# Patient Record
Sex: FEMALE | Race: WHITE
[De-identification: names, ages, dates, MRNs, and addresses within clinical notes are randomized per-mention and may not be internally consistent; named-entity substitution may affect disease eponyms.]

---

## 2018-10-02 ENCOUNTER — HOSPITAL ENCOUNTER (EMERGENCY)
Dept: HOSPITAL 95 - ER | Age: 73
Discharge: HOME | End: 2018-10-02
Payer: MEDICARE

## 2018-10-02 VITALS — WEIGHT: 189.99 LBS | HEIGHT: 65 IN | BODY MASS INDEX: 31.65 KG/M2

## 2018-10-02 DIAGNOSIS — Z85.21: ICD-10-CM

## 2018-10-02 DIAGNOSIS — T18.128A: Primary | ICD-10-CM

## 2018-10-02 DIAGNOSIS — E78.5: ICD-10-CM

## 2018-10-02 DIAGNOSIS — Z79.899: ICD-10-CM

## 2018-10-02 DIAGNOSIS — I10: ICD-10-CM

## 2018-10-02 DIAGNOSIS — Z90.89: ICD-10-CM

## 2019-12-18 NOTE — NUR
4045 CAITLIN EDWARDS AT BEDSIDE GIVING D/C INSTRUCTIONS AFTER 1300 XRAY. IS
TO RETURN TOMORROW MORNING FOR FOLLOW UP CHEST XRAY. DISCHARGE INSTRUCTIONS
RE-ITERATED AND PATIENT VERBALIZED UNDERSTANDING. LEFT CHEST BANDAID IS CDI.
O2 SATS AND VS ARE WNL. HOME WITH FAMILY

## 2019-12-18 NOTE — NUR
ARRIVAL TO STEP, AWAKE, ALERT, NO C/O PAIN OR DISCOMFORT. BANDAID TO LEFT
CHEST CDI. REPORT RECEIVED FROM CAITLIN EDWARDS.O2 SATS AND VS WNL.

## 2020-02-07 NOTE — NUR
LEFT RADIAL TR BAND SITE SOFT NON-TENDER WITH NO HEMATOMA AND NO PULSATILE
BLEEDING. RIGHT GROIN SITE SOFT NON-TENDER WITH NO HEMATOMA AND NO PULSATILE
BLEEDING AND INTACT DRESSING. PT DENIES ANY PAIN; CALL LIGHT IN REACH.

## 2020-02-07 NOTE — NUR
AMBULATED TO THE BATHROOM. TOLERATED WELL. NO BLEEDING AT RIGHT GROIN. LEFT
RADIAL WRIST WITH TR BAND INTACT. DRESSING FOR DISCHARE.

## 2020-02-07 NOTE — NUR
9 CC OF AIR REMOVED OVER 15 MIN FROM NOW DEFLATED LEFT TR BAND; NO HEMATOMA,
NO BLEEDING, SOFT AND NON-TENDER.

## 2020-02-07 NOTE — NUR
TR BAND REMOVED. NO BLEEDING AT SIGHT BUT BRUISED. SITE CLEANED. POLYMEM,
IMMOBILIZER AND SLING APPLIED.IV REMOVED INTACT. 2X2, COBAN AND MANUAL
PRESSURE APPLIED. DISCHARGE INSTRUCTIONS GIVEN WITH VERBAL AND WRITTEN
UNDERSTANDING

## 2020-07-17 NOTE — NUR
DR GALE ROUNDS
DR GALE ROUNDED, UPDATED ON PT STATUS SINCE ARRIVAL TO ICU. PER DR GALE PLAN
TO START PT ON LASIX 40MG IVP Q8H. LEVOPHED GTT TO REMAIN RUNNING AS EXPECTED
FOR BP TO DROP D/T LASIX ADMINISTRATION. PT TO HAVE ECHO DONE TODAY. NO
FURTHER ORDERS AT THIS TIME. WILL CONT TO MONITOR PT.

## 2020-07-17 NOTE — NUR
TRACH SUCTION
PT REQUESTING TO BE SUCTIONED. SUCTIONING TRACH PRODUCED SMALL AMOUNT RED
FROTHY SECRETIONS. PT DENIES ANY FURTHER NEEDS. CALL LIGHT IN REACH. WILL CONT
TO MONITOR PT.

## 2020-07-17 NOTE — NUR
ASSUMED CARE NOTE
RECEIVED REPORT FROM JOSUE WEISS. BEDSIDE ROUNDING DONE. PT RESTING IN BED,
HOB ELEVATED 30 DEGREES. PT EASILY AROUSES TO VERBAL STIMULI, PLEASANT AND
COOPERATIVE WITH CARE. PT WITH RIGHT GROIN CENTRAL LINE WITH LEVOPHED GTT
RUNNING AT 2.5MCG/MIN AND TKO. VSS, SEE FLOWSHEET. TRACH TO VENTILATOR,
SETTINGS: SPONTANEOUS WITH FIO2 50%. PT WITHOUT ANY COMPLAINTS. CALL LIGHT IN
REACH. BEDSIDE TABLE WITH TABLET AND PHONE IN REACH. WILL CONT TO MONITOR PT.

## 2020-07-17 NOTE — NUR
SHIFT SUMMARY
 
PATIENT WAS VERY PLEASANT TO WORK WITH THIS MORNING. SHORTLY AFTER ARRIVING TO
UNIT, BLOOD PRESSURE DROPPED TO 62/46, CONFIRMED WITH MANUAL CUFF. DR. TAYLOR WAS PROMPTLY NOTIFIED, ARRIVED AT BEDSIDE TO PLACE CENTRAL LINE,
LEVOPHED DRIP WAS STARTED. ARITA CATH WAS PLACED AND LASIX DRIP STARTED. PT.
REMAINS IN GREAT SPIRITS. ASSESSMENT IS AS CHARTED. VSS. WILL CONTINUE TO
MONITOR.

## 2020-07-17 NOTE — NUR
ECHO
ECHO TECH AT BEDSIDE TO PERFORM ECHOCARDIOGRAM AT THIS TIME. PT REPOSITIONED
TO LEFT SIDE USING PILLOWS FOR THIS.

## 2020-07-17 NOTE — NUR
DR MELO REQUEST
PT WITH VERY POOR CARDIAC FUNCTION. PER DR MELO, NOC RN TO CALL IF ANY
HEMODYNAMIC OR ELECTRIC INSTABILITY ARE NOTED. INFORMED DR MELO THAT THIS
WOULD BE RELAYED IN REPORT.

## 2020-07-17 NOTE — NUR
DR GALE ROUNDS
PER DR GALE PT TO TRY PO ASA AND STATIN, AWAITING ORDER FOR STATIN. ORDER FOR
CARDIOLOGY CONSULT, REASON NSTEMI. NO FURTHER CHANGES TO PLAN OF CARE AT THIS
TIME. WILL CONT TO MONITOR PT.

## 2020-07-17 NOTE — NUR
DR SNELL ROUNDS
DR SNELL UPDATED ON PT STATUS SINCE ARRIVAL TO ICU. PER DR SNELL D/C LASIX
GTT AND IVP LASIX. POSSIBLE PLAN TO DIURESE PT AFTER LEVOPHED GTT IS OFF. NO
FURTHER CHANGES TO PLAN OF CARE AT THIS TIME. WILL CONT TO MONITOR PT.

## 2020-07-17 NOTE — NUR
CALL FROM DR MELO
PER DR MELO ORDER FOR METOPROLOL TO BE D/C AS PT ON LEVOPHED GTT. ORDER D/C
AS INSTRUCTED.

## 2020-07-17 NOTE — NUR
HEPARIN GTT
PER PHARMACY HEPARIN GTT TO BE STARTED AT 2000 PER PHARMACY DOSING. THIS IS
DUE TO PT HAVING RECEIVED 80MG LOVENOX AS ORDERED THIS MORNING.

## 2020-07-17 NOTE — NUR
ST ELEVATION NOTED
ST ELEVATION NOTED ON MONITOR. DR GALE AWARE. PT WITH CARDIAC HX OF STENT
EARLIER IN YEAR AT WHICH TIME PT WAS NOTED TO HAVE LESION ON CIRCUMFLEX BUT
D/T PT'S CANCER SURGERY REPAIR OF THIS WAS POSTPONED. ORDER FOR STAT EKG. EKG
DONE AND SHOWN TO DR GALE ALONG WITH EKG DONE IN ER YESTERDAY EVENING. ORDERS
RECEIVED FOR ENOXAPARIN 1MG/KG Q12H. THIS ORDER PLACED IN EMR AND EXISTING
ORDER FOR ENOXAPARIN 40MG DAILY D/C. ECHO TAKING PLACE CURRENTLY. NO FURTHER
ORDERS AT THIS TIME. WILL CONT TO MONITOR PT.

## 2020-07-17 NOTE — NUR
DR MELO ROUNDS
DR KAMERON ULLOA, OLU, CHARGE RN, PRESENT. DR MELO UPDATED ON PT STATUS.
PER [facility], ORDERS RECEIVED FOR HEPARIN GTT WITHOUT BOLUS, ASA 81MG PO NOW,
AND PLAVIX 300MG PO NOW. PT ALREADY RECEIVED ASA THIS MORNING, NO ADDITIONAL
ASA TO BE GIVEN. PT TO REMAIN NPO EXCEPT FOR MEDS. NO FURTHER CHANGES TO PLAN
OF CARE AT THIS TIME. OLU PLACED ORDERS AS REQUESTED BY DR MELO.

## 2020-07-17 NOTE — NUR
SHIFT SUMMARY
PT RESTING IN BED, HOB ELEVATED 30 DEGREES. PT EASILY AROUSABLE, ALERT AND
ORIENTED WHEN AWAKE, PLEASANT AND COOPERATIVE WITH CARE. PT CONTINUES TO DENY
ANY PAIN OR DISCOMFORT. LS IMPROVING, REMAIN DIMINISHED IN BASES. PT REMAINS
WITH TRACH TO VENTILATOR ON SPONTANEOUS WITH PEEP 5 AND FIO2 50%. PT CONTINUES
TO DENY ANY SOB. SUCTIONING OCCASIONALLY, PRODUCING SMALL AMOUNTS RED FROTHY
SECRETIONS. BP STABLE ON LEVOPHED, TITRATED TO EFFECT. PT CONTINUES TO DENY
ANY CHEST PAIN/PRESSURE/PALPITATIONS. HR IN 80-90S. DR MELO CONSULTED D/T
ELEVATED TROPONINS AND ABNORMAL EKG, PLAN FOR POSSIBLE INTERVENTION TOMORROW,
PT TO REMAIN NPO. SEE FLOWSHEET FOR VS. ABD NORMAL PER PT, SOFT, NONTENDER. BT
X4, HYPOACTIVE. NO BM. ARITA CATHETER REMAINS PATENT, DRAINING TO GRAVITY.
SKIN OVERALL C/D/I, SCATTERED BRUISING TO BUE. PIV TO KIARRA PULLED D/T AS IT WAS
PAINFUL WITH FLUSH LATER IN SHIFT. CENTRAL LINE TO RIGHT GROIN WNL. NS TKO X2.
PT ADJUSTS POSITION IN BED INDEPENDENTLY, REFUSED REPOSITIONING FROM SIDE TO
SIDE USING PILLOWS, ALTHOUGH AGREED TO HAVING PILLOW UNDER BLE FOR PRESSURE
ALLEVIATION. ECHO DONE. NO ACUTE CHANGES THIS SHIFT.
BED IN LOWEST POSITION, UPPER SIDE RAILS RAISED, CALL LIGHT IN REACH. BEDSIDE
TABLE IN REACH. WILL CONT TO MONITOR PT.

## 2020-07-18 NOTE — NUR
SHIFT SUMMARY
 
PATIENT HAS SLEPT WELL THROUGH NIGHT. NO ACUTE CHANGES TO NOTE OVERNIGHT.
TOLERATING VENTILATOR WELL, LUNG SOUNDS MARGINALLY IMPROVED, HOWEVER WERE NOT
TERRIBLY COARSE AT BEGIN OF SHIFT. AM CURRENTLY
REPLACING ELECTROLYTES, ACCIDENTALLY ORDERED MAGNESIUM AS A DAILY DOSE, WILL
DISCONTINUE FUTURE ORDERS AS PATIENT IS RECEIVING ONE DOSE OF 2 GRAMS AS PER
DR. TAYLOR. NO COMPLAINTS OF CHEST PAIN, DIFFICULTY BREATHING, SHORTNESS OF
BREATH. VSS. ASSESSMENT IS AS CHARTED. WILL CONTINUE TO MONITOR.

## 2020-07-18 NOTE — NUR
UPDATE
PT RESTING IN BED. DR. ALVARADO CAME IN TO SEE PT THIS AM AND WANTS BE TO
BE ON TRACH COLLAR. RT SET PT UP AND PT HAS BEEN TOLERATING WELL. LEVOPHED
WAS TITRATED OFF AT [address] DUMONT WAS IN THIS AM AND SAYS
THE PT WILL GO TO CATH LAB ON MONDAY. THE CREATININE NEEDS TO IMPROVE BEFORE
THEY TAKE HER FOR PROBABLE INTERVENTION. PT IS ON HEPARIN PER PHARMACY. DENIES
CP OR PRESSURE, N/V AND SOB.

## 2020-07-18 NOTE — NUR
SUMMARY
PT DID WELL TODAY. WAS ABLE TO MOVE FROM VENT TO TRACH TO TRACH COLLAR. SHE
HAS TOLERATED TRACH COLLAR WELL. LEVOPHED WAS TITRATED OFF THIS AM. NEGATIVE
ON I&O. UP TO CHAIR WITHOUT ANY DIFFICULTIES. DOES WELL WITH ONE PERSON
ASSIST. DR. ALVARADO CAME IN AND CHECKED ON PT AGAIN. NO NEW ORDERS. CALL
LIGHT IN REACH.

## 2020-07-19 NOTE — NUR
SHIFT SUMMARY
 
PATIENT SLEPT WELL THROUGH NIGHT. WAS MOSTLY INDEPENDENT, ABLE TO MOVE SELF IN
BED, PERFORM CATHETER CARE WHEN GIVEN SUPPLIES, DRINKING WELL. NO C/O PAIN, NO
DIFFICULTY BREATHING, CALLS WHEN REQUIRING SUCTIONING. HEPARIN DRIP ADJUSTED
PER PHARMACY. VSS. WILL CONTINUE TO MONITOR.

## 2020-07-19 NOTE — NUR
DR. ALVARADO IN TO SEE PT. HE IS OK WITH CHANGING STATUS TO PCU BUT WOULD
LIKE PT TO STAY IN ICU ROOM SINCE SHE HAS AN ANGIOGRAM TOMORROW THAT MIGHT BE
COMPLICATED. CHARGE RN NOTIFIED.

## 2020-07-19 NOTE — NUR
SUMMARY
PT WAS UP TO THE CHAIR MOST OF THE DAY. ONE PERSON ASSIST. PT IS GETTING
STRONGER. PT IS ON RA. RT SET PT UP WITH A FOAM TYPE COVER THAT PT CAN STILL
BREATHE THROUGH. PT DOES NOT LIKE IT OPEN TO AIR. SPO2 96%. EATING WELL. OFF
HEPARIN TODAY PER DR. DUMONT AND PUT ON SQ HEPARIN.USES CALL LIGHT
APPROPRIATELY. NO SIGN OF DISTESS.

## 2020-07-20 NOTE — NUR
REPORT FROM A.SNOW RN. ASSUMED PT CARE.
PT ALERT AND ORIENTED. ABLE TO COMMUNICATED NEEDS AND USE CALL LIGHT. PT ON
ROOM AIR, TELE SHOWS NS 90'S PT HAS TRACH WITH HUMIDIFIED AIR AND IN LINE
SUCTION, PT SITTING IN CHAIR AT THIS TIME WITH CALL LIGHT. PT DENIES PAIN.
PATENT ARITA DRAINING YELLOW URINE. VSS. QUAD LUMEN CENTRAL LINE TO RIGHT
GROIN NOTED, DRESSING C/D/I. NO MEDS INFUSING AT THIS TIME. PT PLEASANT.
DENIES NEEDS.
SEE FULL ASSESSMENT.

## 2020-07-20 NOTE — NUR
SHIFT SUMMARY
 
PATIENT HAS SLEPT WELL THROUGH NIGHT. NO ACUTE CHANGES TO NOTE TONIGHT.
ASSESSMENT IS AS CHARTED. VSS. NO C/O PAIN. WILL CONTINUE TO MONITOR.

## 2020-07-20 NOTE — NUR
SUMMARY
 
Pt PCU status. A&O x 4. Answers questions. Follows commands. Communicates
needs either by mouthing words or writing requests. Pt on trach T piece with
26% FiO2. In-line suction catheter in place. Pt SR per monitor. BP stable.
Denies chest pain. Initially NPO as pt was suspected to be having angiogram
today, however during late morning, cath lab staff stated this would not be
happening today. Dr Shaikh aware. Dr Mckay states she will be in to see pt
later this evening, as cardiology has not yet rounded on patient today. Pt sat
up in chair for majority of shift and remains in chair at this time. This RN
offered to assist pt back to bed, however pt declined and stated she would
like to remain in the chair at this time. Pt one person assist OOB. Uses
bedside commode. Had BM today. Pt also has montoya catheter in place for strict
measurement of I&O. Report given to oncoming RN to assume care, Louis.

## 2020-07-21 NOTE — NUR
SHIFT SUMMARY
 
NOT MAJOR EVENTS THROUGHOUT DAY. HAS DENIED CP, SOB, NAUSEA, AND N/T. REMAINS
ON ROOM AIR, VIA 6.0 SHILEY TRACH (CUFFED, NONFENESTRATED) WITH COLLAR. SHE
HAS SPENT A GOOD PORTION OF THE DAY IN THE CHAIR. SHE IS INDEPENDENT FOR THE
MOST PART, JUST NEEDS ASSISTANCE WITH LINES, CHORDS, AND TUBES. SHE ATTEMPTS
TO COUGH THROUGH HER TRACH INTO TISSUES, BUT SOMETIMES NEEDS HELPS WITH
SUCTIONING. I HAVE TRIED A FEW TIMES, BUT HAVEN'T GOT MUCH SUCTIONED. SHE IS
GOOD ABOUT COMMUNICATING HER NEEDS VIA HER iPAD. SHE WILL BE NPO AFTER
MIDNIGHT FOR ANGIO IN THE MORNING (HOPEFULLY). SHE IS IN GOOD SPIRITS AND
HASN'T REQUIRED MUCH TODAY. SHE CURRENTLY REMAINS IN THE CHAIR, CALL LIGHT
WITHIN REACH.

## 2020-07-21 NOTE — NUR
ASSUMED CARE
 
RECIEVED REPORT FROM NOC RN. PT IS LYING IN BED ASLEEP. SHE HAS A 6.0 CUFFED
SHILEY TRACH WITH A HUMIDITY COLLAR AROUND IT. SHE HAS A SINUS RHYTHM WITH A
LBBB. SHE HAS A PATENT ARITA DRAINING LIGHT YELLOW URINE. BED LOW AND LOCKED.
CALL LIGHT WITHIN REACH.

## 2020-07-21 NOTE — NUR
PT ASKED TO BE SUCTIONED. INLINE SUCTIONING DONE, LARGE THICK GLOB COUGHED UP
BY PT, PT UNABLE TO CLEAR. RESP CALLED.
RESP AT BEDSIDE FOR TRACH REPLACEMET AND SUCTIONING.

## 2020-07-21 NOTE — NUR
SHIFT SUMMARY
 
PT HAD WONDERFUL EVENING AND SLEPT WELL. PT VSS STABLE THROUGHOUT SHIFT. PT
REMAINED ALERT AND ORIENTED. PT TRINA PO WELL. PT APPRECIATES BEING UP IN CHAIR.
PT ABLE TO COMMUNICATE NEEDS AND USE CALL LIGHT APPROPRIATELY. PT NEEDED
SUCTIONING MULT TIMES THROUGHOUT SHIFT AND SECRETIONS NOTED TO BE VERY THICK
AND BLOODY. PT HAS NO COMPLAINTS OF PAIN. ARITA PATENT AND DRAINING CLEAR
YELLOW URINE. PT HAS QUAD LUMEN PICC TO RIGHT GROIN AREA. CAPS CHANGED THIS AM
WITH LAB DRAW. SITE CLEAN AND DRY. DRESSING INTACT. PT HAS NO SWELLING OR
TENDERNESS. RESP CHANGED TRACH THIS EVENING. TRACH COLLAR WITH INLINE SUCTION
AVAIL. PT SKIN INTACT. WILL CONTINUE TO MONITOR AND REPORT TO DAY SHIFT.

## 2020-07-22 NOTE — NUR
PT BACK TO ROOM FROM CATH LAB. SHEATH IN PLACE TO L GROIN. PT EDUCATED ABOUT
GROIN PRECAUTIONS, NO FLEXION OF LEGS AND TO KEEP HEAD DOWN ON PILLOW. PT IS
VERY COMPLIANT WITH DIRECTIONS. WILL PULL THE SHEATH.

## 2020-07-22 NOTE — NUR
ASSUMING CARE - 1900
 
REPORT RECEIVED ON PATIENT. BEDSIDE COMPLEX VASCULAR ASSESSMENT COMPLETE WITH
OFFGOING RN. GROIN SITE IS SOFT. DRESSING IS CLEAN, DRY, AND INTACT. PULSES
PALPABLE IN BILATERAL LOWER EXTREMITIES. PATIENT ON BEDSIDE MONITOR IN NSR
WITH A BBB. ARITA CATHETER PATENT AND DRAINING. PATIENT HAS CALL LIGHT WITHIN
REACH. NO NEEDS AT THIS TIME. SCHEDULED 2000 H&H LAB TO BE DRAWN.

## 2020-07-22 NOTE — NUR
SUMMARY
PT RESTING IN BED. HAD AN ANGIOGRAM TODAY WITHOUT INTERVENTION. PT CAME FROM
CATH LAB WITH SHEATH IN PLACE. ABLE TO PULL SHEATH IN ICU. PT ENDED UP WITH
LARGE HEMATOMA ALMOST IMMEDIATELY. THAT WAS FROM L GROIN ACCESS SITE THROUGH
PUBIS AREA INTO R GROIN. WAS ABLE TO STOP BLEEDING WITH ASSISTANCE FROM
SEVERAL RN'S AND SITE WAS STABLE 23 MINUTES AFTER INITIAL START OF MANUAL
PRESSURE. PT GOT 2 UNITS OF PRBC'S. US WAS DONE TO R/O ANEURYSM. CLEAR
OCCLUSIVE DRESSING WAS APPLIED TO SITE WITH ALDAIR DRESSING. NOW SITE IS SOFT
AND NON TENDER ACROSS ABD. THERE IS NO LONGER A HARD LUMP UNDER DRESSING. HIP
INTO BACK IS SOFT AND NON TENDER. BP HAS BEEN STABLE. H&H TO BE RECHECKED AT
2000. PEDAL PULSES ARE PALPABLE.  PT WAS ABLE TO SIT HOB UP FOR DINNER AND
SITE IS STILL STABLE. PT IS VERY COMPLIANT WITH GROIN SITE PRECAUTIONS. HAS
TRACH THAT IS CONNECTED TO TRACH COLLAR FOR HUMIDITY. INNER CANNULA CHANGED
TODAY PER PT REQUEST. GOT A PLUG OUT THIS AFTERNOON OF THICK YELLOW SPUTUM
WITH A TINGE OF PINK. SPO2 IS 95% AND IS ON L TOE DUE TO L GROIN SITE. NO SIGN
OF DISTRESS AT THIS TIME. USES CALL LIGHT APPROPRIATELY AND ABLE TO EITHER
MOUTH WORDS OR USES TABLET TO WRITE NEEDS DOWN.

## 2020-07-22 NOTE — NUR
PT LAYING FLAT. US BEING DONE AND 1ST UNIT OF PRBC'S TRANSFUSING NOW. STAYING
AT BEDSIDE TO MONITOR R GROIN SITE. DR. BHARDWAJ IN TO SEE PT AND CHECKED
SITE AS WELL. SMALL HEMATOMA HAS IMPROVED AS WELL.

## 2020-07-22 NOTE — NUR
PROVIDER UPDATE
 
CLARIFIED ORDERS WITH DR. BHARDWAJ REGARDING SUBQ HEPARIN THIS EVENING. OKAY
TO HOLD TONIGHT'S DOSE. UPDATED PROVIDER ON PATIENT STATUS. SBP IN THE 90S.
RECHECK H&H SENT. AWAITING RESULTS. PATIENT IS ASYMPTOMATIC WITH LOW BP.
DENIES DIZZINESS. ANGIOGRAM SITE SOFT WITH A CLEAN AND DRY DRESSING WITH NO
NEW DRAINAGE.

## 2020-07-22 NOTE — NUR
Pt called this morning. unfortunately this message was never reviewed. Pt requesting refills for losartan and amlodipine.    UPDATE
AT 1100 SHEATH TO L GROIN WAS REMOVED AND MANUAL PRESSURE WAS HELD. AT 4
MINUTES IN TO MANUAL PRESSURE RN NOTICED A HEMATOMA STARTING IN PUBIS AREA
UNDER THE PUNCTURE SITE. CALLED FOR ASSISTANCE, RN X4 AND DR. AVILES-
INTENSIVIST CAME TO BEDSIDE. ABLE TO GET HEMATOMA STOPPED. NOW GROIN IS
SOFT EXCEPT FOR QUARTER SIZE SPOT RIGHT UNDER PUNCTURE AREA. DOPPLER PULSES
FOUND IN PEDAL AND PT. BP DROPPED FOR A SMALL AMT OF TIME BUT RECOVERED
QUICKLY. DR. AVILES ORDERED 2 UNITS OF PRBC'S STAT FOR DROP IN H&H AS WELL.
DR. MICHEL WAS UPDATED AND HAVE A CALL OUT TO FURTHER UPDATE HER. PT DENIES CP
OR PRESSURE OR ANY BACK PAIN. EDUCATED ABOUT S/S OF RETROPEROTINEAL BLEED.
CLEAR OCCLUSIVE DRESSING PLACED WITH ALDAIR UNDER DRESSING. NO BLOOD COMING
THROUGH ON DRESSING. BP STABLIZED. IVF STOPPED PER DR. AVILES DUE TO RISK OF
FLUID OVERLOAD.

## 2020-07-22 NOTE — NUR
END OF SHIFT SUMMARY
 
PATIENT INTACT NEUROLOGICALLY. COMMUNICATES WITH WRITING PAD. LUNGS CLEAR ON
ROOM AIR WITH HUMIDIFIED TRACH COLLAR. NSR WITH BBB. BP STABLE. NPO AS OF
MIDNIGHT. ARITA CATHETER IN PLACE. OUTPUT ADEQUATE. SKIN INTACT. CENTRAL LINE
AND PIV X 1 IN PLACE. POTASSIUM REPLACED THIS AM. CATH LAB PROCEDURE PLANNED
FOR TODAY.

## 2020-07-22 NOTE — NUR
PT SITTING UP IN BED. CAN POSITION SELF IN BED. DR. BHARDWAJ CAME IN TO SEE
PT. PLAN IS TO TAKE HER TO CATH LAB THIS AM. DR. BHARDWAJ OKAY'D MEDS TO BE
GIVEN PO AND HEPARIN SQ. NO REQUESTS OR COMPLAINTS FROM PT. CALL LIGHT IN
REACH.

## 2020-07-23 NOTE — NUR
PT DISCHARGED. WENT OVER INSTRUCTIONS AND ANSWERED ANY QUESTIONS. PT WAS ABLE
TO DRESS SELF WITH MINIMAL ASSIST. PT WHEELED TO PT ENTRANCE VIA W/C BY RN.
ASSISTED INTO CAREGIVERS CAR. NO SIGN OF DISTRESS. VALUABLES WITH PT.

## 2020-07-23 NOTE — NUR
PT SITTING UP IN RECLINER CHAIR. MINIMAL 1 PERSON ASSIST JUST TO MANAGE LINES.
PT IS ON RA WITH HUMIDIFIER TO TRACH. PT IS ASKING IF IT CAN BE REMOVED. SHE
NORMALLY ONLY HAS A STOMA AT HOME. SPOKE WITH DR. AVILES ABOUT THIS AND SHE
WILL REVIEW THE CHART. L GROIN SITE IS STABLE, NO HEMATOMA. DR. MICHEL, DR. SNELL, AND DR. BHARDWAJ HAVE ALL SEEN PT THIS AM. HOPEFULLY WILL BE ABLE TO
DISCHARGE PT THIS AFTERNOON. NO SIGN OF DISTRESS. CALL LIGHT IN REACH.

## 2021-01-22 NOTE — NUR
SHIFT SUMMARY
 
PT HAS RESTED MOST OF THE NIGHT, SHE HAS DENIED NEEDS WHEN ASKED. SHE
AMBULATES TO THE BATHROOM WITH 1 SBA. IV ANITBIOTICS INFUSED PER ORDERS. PT
HAS A CHRONIC TRACH WITH O2 ATTACHED AT 8L. PT IS INDEPENDENT WITH TRACH AND
IS ABLE TO MANAGE INDEPENDENTLY ON HER OWN. LUNG SOUNDS ARE COURSE AND MOIST.
SATS WNL. COUGH IS PRODUCTIVE WITH THICK BLOOD TINGED SPUTUM. SPUTUM SAMPLE
COLLECTED AND SENT FOR CULTURE. VITALS ARE STABLE. PT A/OX4, CALLS
APPROPRIATELY AND MAKES NEEDS KNOWN. BED IN LOWEST POSITION, CALL LIGHT WITHIN
REACH.

## 2021-01-22 NOTE — NUR
SHIFT SUMMARY
PATIENT ALERT AND ORIENTED THROUGHOUT THIS SHIFT. PATIENT COOPERATIVE WITH
STAFF. PATIENT WORKED WITH PT/OT THIS SHIFT. PATIENT ON 6L O2 VIA TRACH, EQUAL
TO HOME O2 LEVEL. PATIENT STATES THAT SHE DOESN'T FEEL WELL ENOUGH TO GO HOME
AT THIS POINT. PATIENT REMAINS ON IV ANTIBIOTICS. PATIENT IS A STANDBY ASSIST
TO THE BATHROOM. PATIENT SITTING UP IN BED THROUGHOUT THIS SHIFT. PATIENT
CURRENTLY SITTING UP WATCHING TELEVISION.

## 2021-01-23 NOTE — NUR
SHIFT SUMMARY
PATIENT ALERT AND ORIENTED. HAD NO COMPLAINTS OF PAIN. SOME SHORTNESS OF
BREATH NOTED WHEN PATIENT WAS UP AND MOVING AROUND. PT SLEPT WELL OVERNIGHT.
IV PATENT AND FLUSHED. BED IN LOWEST POSITION WITH WHEELS LOCKED. CALL LIGHT
WITHIN REACH. REPORT GIVEN TO ONCOMING RN.

## 2021-01-23 NOTE — NUR
PT RESTING IN BED, READING AFTER DINNER AND MEDICATION ADMIN. PT MAKES NO
COMPLIANTS OF SOB OR PAIN AT THIS TIME. IV SL AND WNL. TRACH O2 ON 6L. STAFF
WILL CONT. TO MONITOR.

## 2021-01-24 NOTE — NUR
PHYSICIAN COMMUNICATION
CONTACTED ON CALL PHYSICIAN, DR PELAEZ, AS REQUESTED TO NOTIFY HER OF THE
PATIENT'S BLOOD PRESSURE AFTER RECEIVING A 500 ML FLUID BOLUS. ALSO NOTIFIED
HER THAT THE PATIENT WAS NOW CONSISTENTLY SATING AT 91% SINCE SHE WAS PLACED
ON THE AIRVO, AND THAT THE PATIENT WAS COMFORTABLE AND NOT HAVING ANY SHORNESS
OF BREATH WHILE RESTING IN BED. DR PELAEZ SAID TO RECHECK THE PATIENT'S BLOOD
PRESSURE IN HALF AN HOUR AND CALL HER IF THE MAP IS LESS THAN 65.

## 2021-01-24 NOTE — NUR
WAS NOTIFIED BY PCU MONITOR TECH THAT PATIENT WAS SHOWING ELEVATED ST WAVES.
DR VASQUEZ WAS NOTIFIED AND STAT EKG, TROPONIN AND HEP DRIP WAS ORDERED. WILL
CONT. TO MONITOR.

## 2021-01-24 NOTE — NUR
SHIFT SUMMARY
PATIENT ALERT AND ORIENTED. HAD NO COMPLAINTS OF PAIN. PATIENT IS NOW ON AN
AIRVO AND IS SATING AT 92%. PATIENT RECEIVED A 500 ML BOLUS OF LR AND HAD AN
ABG DRAWN BY RT. BLOOD PRESSURE HAS SLIGHTLY IMPROVED THIS MORNING AND PATIENT
STATES SHE IS BEEING A LITTLE BETTER WITH HER BREATHING NOW THAT SHE IS ON THE
AIRVO. IV PATENT AND FLUSHED. BED IN LOWEST POSITION WITH WHEELS LOCKED. CALL
LIGHT WITHIN REACH. REPORT GIVEN TO ONCOMING RN.

## 2021-01-24 NOTE — NUR
CHANGE OF PT STATUS
 
PATIENT STARTED THE SHIFT ON 6 LITERS O2. AROUND 2100 PT AMBULATED TO THE
RESTROOM, HER O2 WAS TURNED UP TO 15 LITERS TO HELP PREVENT HER FROM DESATING
WHILE UP. PATIENT HAD A COUGHING SPELL IN THE BATHROOM THE CAUSED HER TO HAVE
A BM. WHEN SHE RETURNED TO HER BED SHE WAS VERY DYSPNIC AND WAS SATING AT 85%.
THIS RN CALLED PT'S RT, ANÍBAL, AND HE SAID THAT SHE SHOULD RECOVER AND TO
WATCH HER. WHEN THE PATIENT WAS NO LONGER DYSPNIC HER O2 SAT WAS AROUND 90% SO
FLOW WAS KEPT AT 15 LITERS.
 
AT 2150 PT'S VITALS WERE TAKEN, BLOOD PRESSURE WAS LOW AND O2 WAS AT 86%. RT
CALLED AGAIN, WHO TRIED SUCTIONING THE PATIENT AND THEN PLACED HER ON AN O2
HEATER TO TRY AND HELP THE PATIENT GET MORE SECRETIONS UP, HOPING THAT WOULD
HELP THE PATIENT OXYGENATE BETTER.
 
AT 2237 PT'S BLOOD PRESSURE WAS STILL LOW BUT SLIGHTLY IMPROVED AND HER SATS
WERE AT 92%
 
VITALS RETAKEN AT 0008. BLOOD PRESSURE STILL LOW, O2 AT 85%. RT CALLED AGAIN
TO ADJUST SETTINGS FOR O2. RT DISCUSSED THAT THE PATIENT WOULD LIKELY NEED TO
BE PLACED ON AN AIRVO.
 
ON CALL PHYSICIAN, DR PELAEZ, CALLED AT 0035 TO NOTIFY HER OF THE ABOVE
CHANGES IN THE PATIENT'S CONDITION. DR PELAEZ SAID THAT SHE WOULD BE PUTTING
ORDERS FOR AN ABG  ML BOLUS OF LR. DR PELAEZ ALSO SAID THAT SHE WANTED
TO BE CALLED WITH THE RESULTS.

## 2021-01-25 NOTE — NUR
ALERT AND ORIENTED. DENIES ANY PAIN. LUNGS COARSE T/O WITH FINE CRACKLES IN
THE BASES. USES HOME WRITING BOARD TO COMMUNICATE. ONE PERSON STANDBY ASSIST
TO BSC. HAS DENIED NEED FOR SUCTIONING. IS ABLE TO COUGH OUT SPUTUM THROUGH
TRACH. ON AIRVO AT HIGHEST SETTING. MOVED TO PCU 6.

## 2021-01-25 NOTE — NUR
UPDATE:
 
PT TRANSFERED FROM MEDICAL FLOOR VIA BED. AIRVO AT 90%, OVER TRACH. PATIENT
SATING 86-90%. TELE SHOWING SINUS RHYTHM. PT DENIES ANY NEEDS AT THIS TIME.
VITAL SIGNS STABLE. HEPARIN INFUSING.

## 2021-01-25 NOTE — NUR
SHIFT SUMMARY:
 
PT IS ALERT AND ORIENTED X4, USING HER WRITING PAD TO TALK. ON AIRVO AT MAX
OVER TRACH OSTOMY. SATING AT 86-87% AT TIMES DROPPING DOWN TO 85%. DENIES
SHORTNESS OF BREATH/CHEST PAIN. TELE SHOWING SINUS RHYTHM. VITAL SIGNS STABLE.
DR. GALE IN TO SEE PATIENT, AND ATTEMPT MADE TO PLACE TRACH. SITE MILDLY
BLEEDING MIXED WITH SPUTUM. PATIENT ABLE TO COUGH UP SPUTUM. CONTINUING TO
CLOSELY MONITOR SITE AND BLEEDING. HEPARIN INFUSING. CT/LUNG BIOPSY TOMORROW,
NPO AT MIDNIGHT. CALL TO CT IN MORNING FOR TIME TO TURN OFF HEPARIN. SIZE 4
FENISTRATED CUFF AT BEDSIDE WITH SUCTION PER RESPIRATORY. CALL LIGHT IN REACH
AND BED REMAINED IN LOW POSITION.

## 2021-01-25 NOTE — NUR
SHIFT SUMMARY
PATIENT ALERT AND ORIENTED. HAD NO COMPLAINTS OF PAIN. PATIENT REMAINS ON
AIRVO DUE TO HER INCREASED OXYGEN NEEDS AND HOW EASILY SHE DESATURATES.
HEPARIN DRIP TITRATED PER PHARMACY. IVS PATENT AND FLUSHED. BED IN LOWEST
POSITION IH WHEELS LOCKED. CALL LIGHT WITHIN REACH. REPORT GIVEN TO ONCOMING
RN.

## 2021-01-25 NOTE — NUR
CT TECH ADVISED PATIENT ON CONTINUOUS HEPARIN IV. THEY WILL CHECK PROTOCOLS
AND LET US KNOW IF/WHEN BIOPSY CAN BE DONE.

## 2021-01-26 NOTE — NUR
SHIFT SUMMARY
 
PT HAS BEEN ON AIRVO AT 60LPM AND 94% WITH 02 SATS 80-90. START OF SHIFT PT
WAS LOW 80'S DROPPING TO 70'S WITH EXERTION. O2 SATS IMPROVED BY AM TO AROUND
90%. RT SUCTIONED PT ONCE WITH LITTLE SPUTUM REMOVED, PT ABLE TO CLEAR AIRWAY
WITH COUGHING, SPUTUM WAS BLOOD TINGED, CLEAR AND THICK. LESS BLOOD IN SPUTUM
BY AM. BP  SYSTOLIC. PT STATED HAVING POSTPRANDIAL HYPOTENSION. HR
'S. PT HAS BEEN NPO SINCE MIDNIGHT OTHER THAN AM MEDICATION. PT HAD A
RESTFUL, UNEVENTFUL NIGHT.

## 2021-01-26 NOTE — NUR
SHIFT SUMMARY
PT ALERT AND ORIENTED. VS STABLE. O2 SATS HAVE REMAINED ABOVE 86% ON 60L AND
93% FIO2 VIA TRACH COLLAR. HR SR WITH BBB. PT DENIES ANY CP/PRESSURE. PT
DENIES FEELING SOB. LS COARSE THROUGHOUT. PT ABLE TO AMBULATE WITH 1 ASSIST TO
MANAGE LINES. PT COMMUNICATES WITH A COMMUNICATION BOARD BECAUSE SHE IS UNABLE
TO SPEAK. HEP GTT DC'D THIS SHIFT AND PLAN TO RESTART XARELTO IN 48 HOURS.
WILL CONTINUE TO MONITOR CLOSELY AND REPORT TO ONCOMING RN. CALL LIGHT IN
REACH.

## 2021-01-27 NOTE — NUR
***SHIFT NOTE***
PT COMMUNICATES WITH WRITER BOARD. PT HAS BEEN RESTING WELL IN BED WITH NADN,
VSS, DENIES CP OR SOB. A/O X4, ANSWERS QUESTIONS APPROPRIATELY. HEPARIN WAS
HUNG AND IS CURRENTLY INFUSING WELL. PT HAS BEEN A ONE PERSON SBA TO [facility]. PT
UP TO BEDSIDE TO EAT ALL MEALS. ABLE TO COMMNICATE HER NEEDS WELL

## 2021-01-27 NOTE — NUR
SHIFT SUMMARY
 
NO ACUTE CHANGES THIS SHIFT. PT A&OX4. USES TABLET TO
WRITE/COMMUNICATE. SP02>80% W/ AIRVO BLOWING INTO TRACH, 91% FI02, 60L.
TELEMETRY READS SR/ST W/ BBB. HR 80'S-100'S. PT UP TO BSC TO VOID AND HAVE 1
BM W/ 1 PERSON ASSIST THIS SHIFT. PT DENIED PAIN. PT SLEPT UPRIGHT SITTING IN
BED T/O THE NIGHT. CALL LIGHT IN REACH. WILL GIVE REPORT TO ONCOMING NURSE.

## 2021-01-28 NOTE — NUR
UPDATE
PATIENT COMPLAINING OF A "CHARLEY HORSE" IN HER RIGHT CALF. WARM BLANKET DID
NOT PROVIDE PATIENT WITH ANY RELIEF. NURSE PRACTIONER VANESSA NOTIFIED. ORDERS
RECIEVED.

## 2021-01-28 NOTE — NUR
Spiritual care visit conducted. Patient is sitting up in bed and alert.
Patient communicates with a tablet. She tells me about her 5 time anderson with
cancer, her love for books, her inspirations and ways of coping with
suffering. I highlight patient's strength and ability to overcome and maintain
a positive outlook, and provide therapeutic conversation and a calming
presence. Patient responds well and shows signs of an elevated mood. I will
continue to attempt aide patient in the emotional/spiritual side of medical
issues.

## 2021-01-28 NOTE — NUR
***SHIFT NOTE***
PT WITH TITRATION OF HEPARIN X2 TODAY, WILL D/C HEPARIN AFTER THIS NOTE TO
BEGIN XARELTO PO. OTHERWISE NO ACUTE CHANGES THIS SHIFT.

## 2021-01-28 NOTE — NUR
SHIFT SUMMARY
PATIENT PLEASENT AND COOPERATIVE THROUGHOUT THE NIGHT. PATIENT ABLE TO MAKE
NEEDS KNOWN THROUGH WRITTING. PATIENT APPEARED TO NAP ON AND OFF THROUGHOUT
THE NIGHT. PATIENT REPORTS HAVING PINK/RED TINGED SPUTUM THROUGHOUT THE NIGHT,
DAY SHIFT RN AWARE TO MONITOR SPUTUM. PATIENT ON AIRVO VIA TRACH STOMA.
HEPARIN GTT RUNNING PER ORDERS. REPORT GIVEN TO ONCOMING RN.

## 2021-01-29 NOTE — NUR
SHIFT SUMMARY
PATIENT PLEASENT AND COOPERATIVE THROUGHOUT THE NIGHT. PATIENT REPORTS THIS
MORNING THAT THE CRAMPING IN HER RIGHT CALF IS STILL THERE BUT IT IS "BETTER."
PATIENT DECLINED THE OFFER FOR FURTHER INTERVENTION. PATIENT STATED TO "GIVE
IT MORE TIME" BEFORE PURSUING ANYTHING FURTHER FOR THE CRAMPING. PATIENT
APPEARED TO SLEEP WELL ON AND OFF THROUGHOUT THE SECOND HALF OF THE NIGHT.
PATIENT CONTINUES TO BE ABLE TO EXPRESS HER NEEDS WELL THROUGH WRITTING.
PATIENT CONTINUES TO BE ON THE AIRVO VIA STOMA. CONTINUOUS BIOX IN PLACE. WILL
CONTINUE CURRENT PLAN OF CARE.

## 2021-01-29 NOTE — NUR
UPDATE
RN INTO ROOM TO REASSESS PATIENT'S CRAMPING IN HER LEG, PATIENT APPEARS TO BE
ASLEEP, RESPIRATIONS EVEN AND UNLABORED.

## 2021-01-29 NOTE — NUR
ASSUMED CARE OF PATIENT AT APPROXIMATELY 1905 FROM YADIRA VEGAS RN.  PATIENT
ALERT AND ORIENTED X4; ABLE TO SPEAKS SOME WORDS; USES SPECIAL PAD TO WRITE ON
TO COMMUNICATE WITH STAFF.  PATIENT DENIES PAIN, NUMBNESS, TINGLING, DIZZINESS
OR NAUSEA.  PATIENT REPORTS BLOODY SPUTUM FOR THE PAST FEW DAYS.  PATIENT
DESATS TO 70% WITH AMBULATION; RECOVERS QUICKLY; PULSE OXIMETRY ON PATIENT'S
RIGHT EAR.  NSR W/ BBB ON TELE; OXYGEN SATURATION ABOVE 86% (PER ORDER) ON
BLOW BY AIRVO.  PIV S/L.  SBA TO BEDSIDE COMMODE.
PATIENT CURRENTLY RESTING IN BED; CALL LIGHT IN REACH; BED IN LOWEST
POSISTION; BED ALARM ON.

## 2021-01-29 NOTE — NUR
***SHIFT NOTE***
PT HAS BEEN RESTING WELL IN BED T/O THE DAY, ABLE TO ADJUST SELF TO SIT AT
BEDSIDE TO EAT MEALS. PT A/O X4, ANSWERS QUESTIONS WITH HER WRITING TABLET.
APO2 HAS REMAINED AROUNS 92% T/O THE DAY ON AIRVO. NADN, DENIES CP OR SOB.

## 2021-01-31 NOTE — NUR
SHIFT SUMMARY
 
NO ACUTE CHANGES TO REPORT THIS SHIFT. PT HAS RESTED MOST OF THE NIGHT, SHE
HAS DENIED NEEDS. SATS REMAIN WITHIN GOAL RANGE WHILE  AWAKE AND SLEEPING. 50
L VIA AEROFLOW IN PLACE. PT SBA UP TO BSC. ASSESSMENT UNCHANGED. BED IN LOWEST
POSITION, CALL LIGHT WITHIN REACH.

## 2021-01-31 NOTE — NUR
PT SUMMARY:
NO ACUTE CHANGE FOR THE SHIFT, PT REMAINS ON AIRVO 50L BLOWBY VIA TRACH/STOMA
THE REST OF THE VITALS HAS BEEN STABLE.  DENIES CHEST PAIN/PRESSURE OR ANY
KIND OF PAIN WAS UP IN THE CHAIR FOR LUNCH AND BREAKFAST, USES BSC FOR
TOILETING. NO OTHER ISSUES ENCOUNTERED FOR THE PT HAS BEEN PLEASANT AND
COOPERATIVE USES [email or web address] BOARD FOR COMMUNICATION. ALERT AND ORIENTED X4. WILL
MONITOR UNTIL END OF SHIFT

## 2021-02-01 NOTE — NUR
PT SUMMARY:
NO ACUTE CHANGE FOR THE SHIFT, PT REMAINS ON 50L OF AIRVO BLOWBY VIA
TRACH/STOMA. SATS KEPT ABOVE 86% WHEN AWAKE. PT DENIES ANY PAIN FOR THE SHIFT.
PT HAS BEEN PLEASANT AND COOPERATIVE. STILL AWAITING FOR BIOPSY GENOME TESTIN
RESULT. NO OTHER ISSUES ENCOUNTERED FOR THE SHIFT, ABLE TO MAKE NEEDS KNOWN.
WILL MONITOR UNTIL END OF SHIFT

## 2021-02-01 NOTE — NUR
PT HAD 6 BEATS RUNS OF VTACH, PT WAS ASYMPTOMATIC PT DIDNT FEEL ANY CHEST
PAIN/PRESSURE. DR MICHEL CALLED AND MADE AWARE NO NEW ORDERS AT THIS TIME.
WILL MONITOR

## 2021-02-01 NOTE — NUR
SHIFT SUMMARY
PATIENT IS ALERT, ORIENTED, AND COOPERATIVE WITH CARE. REPOSITIONS SELF IN BED
AND IS A 1 PERSON ASSIST TO BEDSIDE COMMODE. USES CALL LIGHT APPROPRIATELY.
PATIENT USES TABLET TO WRITE RESPONSES DUE TO STOMA. 02 SATS 85-90% ON AIRVO
WITH 50L O2 WHICH IS WITHIN DRs PERAMETERS. PATIENT SLEPT MOST THE NIGHT. VSS,
NO ACUTE CHANGES. CALL LIGHT IN REACH.

## 2021-02-02 NOTE — NUR
SHIFT SUMMARY
PT A&O$; NONVERBAL BUT COMMUNICATED WITH WRITING BOARD OR MOUTHING WORDS. PT 1
PERSON ASSIST TO CHAIR AND BATHROOM. PT DENIES PAIN, CHEST PAIN, SOB, NAUSEA
AND DIZZINESS/LIGHTHEADEDNESS. PT SPO2 >86% ON AIRVO/TRACH COLLAR 50L FIO2
87-88%. VSS. NO OTHER ACUTE CHANGES NOTED DURING SHIFT. REPORT GIVEN TO
ONCOMING RN.

## 2021-02-02 NOTE — NUR
PT COMMUNICATED THAT SHE NEED PILLED CRUSH; UNABLE TO CRUSH POTASSIUM,
NOTIFIED DR MICHEL NEW ORDERS FOR POTASSIUM CLORIDE ELIXAR. NOTIFIED DR MICHEL
OF BP; NEW ORDERS TO HOLD LASIX THIS AM. WILL CONTINUE TO MONITOR.

## 2021-02-02 NOTE — NUR
ASSUMED CARE
RECEIVED BEDSIDE REPORT FROM JOSUE NEWSOME; PT A&O X 4; DENIES CHEST PAIN; VSS;
SINUS TACH NOTED ON TELE W/ ; O2 SATS >89 ON AIRVO TO STOMA; DISCUSSED
W/ RT O2 SATS PARAMETERS; PT DENIES NEEDS CURRENTLY; CALL LIGHT IN REACH; BED
IN LOWEST POSITION.

## 2021-02-02 NOTE — NUR
SHIFT SUMMARY
PATIENT IS A&O X4, COOPERATIVE WITH CARE. REPOSITIONS SELF IN BED. SLEPT MOST
THE SHIFT. PATIENT WRITES ON TABLET TO COMMUNICATE. 02 SATS >82% WHILE
SLEEPING ON AIRVO @50L. VSS, NO ACUTE CHANGES. CALL LIGHT IN REACH.

## 2021-02-03 NOTE — NUR
SHIFT SUMAMRY:
 
PT ALERT AND ORIENTED X4. NON VERBAL DUE TO TRACH STOMA. USING WRITTING PAD TO
COMMUNICATE. ON AIRVO AT 50L AND 78% SATING AT 86%. DENIES SHORTNESS OF
BREATH. WITH MOVEMENT PT WILL DESATURATED TO 83% AT TIMES. TELE SHOWING SINUS
RHYTHM WITH BUNDLE BRANCH BLOCK, HR AVERAGING . DENIES PAIN/CHEST
PRESSURE. DR. CURRAN IN TO SEE PATIENT TO DISCUSS CHEMOTHERAPY. FIRST
INFUSION OF CHEMOTHERAPY GIVEN TODAY BY CHEMO NURSE, PT ON CHEMOTHERAPY
PRECAUTIONS. PATIENT TOLERATED THERAY WELL. VITAL SIGNS HAVE REMAINED STABLE.
PT EATING DINNER AND DENIES ANY PAIN/NAUSEA. OCCASIONAL PRODUCTIVE COUGH THAT
IS CLEAR. NEIGHBOR IN TO VISIT PATIENT THIS EVENING BRIGHTENING HER MOOD. 1
PERSON ASSIST TO BEDSIDE COMMODE.  CALLING APPROPRIATLY. BED REMAINED LOW AND
LOCKED. WILL CONTINUE TO MONITOR AND REPORT OFF.

## 2021-02-03 NOTE — NUR
CHEMO INFUSION COMPLETED
PT TRINA WELL, NO C/O. VS AT BASELINE. POWERGLIDE LEFT UPPER ARM PATENT T/O
INFUSION.

## 2021-02-03 NOTE — NUR
CHEMO INFUSION
REVIEWED PT EDUCATION HANDOUT WITH PT ABOUT ORDERED CHEMO- PT VERB
UNDERSTANING AS WELL AS S/SX OF POSSIBLE REACTIONS. VS AT BASELINE FOR PT.
POWERGLIDE LEFT UPPER ARM WITH + BLOOD RETURN AND FLUSHES EASILY.

## 2021-02-03 NOTE — NUR
PATIENT IS PLEASENT. VITALS ARE STABLE HR AT SR WITH BBB @94. AIRVO @ 50L AND
82%. READING AT RANGES OF 84 TO 86%. ABLE TO USE BSC WITH 1X ASSIST. REDDNESS
IN COCCYX AREA WITH PRESSURE SORE OF 1CM ON LEFT SIDE AND OTHERS IN
DEVELOPMENT. A/O X4. WILL CONTINUE TO MONITOR.

## 2021-02-03 NOTE — NUR
SHIFT SUMMARY
PT A&O X 4; VSS; DENIES CHEST PAIN; SINUS SONY W/ BBB NOTED ON TELE; O2 SATS
>89 ON AIRVO TO STOMA; TITRATED VIA RT W/ PARAMETERS; SLEPT SEVERAL HOURS IN
BETWEEN INTERVENTIONS; CALLS APPROPRIATELY AND MAKES NEEDS KNOWN; CALL LIGHT
IN REACH;  BED IN LOWEST POSITION; WILL CONTINUE TO MONITOR CLOSELY UNTIL HAND
OFF TO DAY SHIFT RN.

## 2021-02-04 NOTE — NUR
HAVING A HARD TIME GETTING A ACCURATE SAT ON PT, RT PLACED A HEAD BAND ON
HER, SHE IS IN THE 70'S, PULMINARY CALLED TO ROOM AS SHE IS ON AIRVO MAXED,
 PUT HER ON A VENT AND WILL MOVE TO ICU, GAVE REPORT TO ROMULO SALAS, WILL
MOVE AS SOON AS ROOM AVAILABLE, RT STAYING WITH PT UNTIL MOVE. PT WAS GIVEN
2MG ATIVAN FOR ANXIETY. CALL LIGHT IN REACH.

## 2021-02-04 NOTE — NUR
SHIFT SUMMARY
 
VITALS STABLE. SR/ST WITH BBB 80'S-110'S. AIRVO @ 50L, FIO2 79% W/ SPO2 > 80%
WHEN SLEEPING AND >84% WHEN AWAKE. UP WITH ASSISTANCE TO BSC. SKIN BREAKDOWN
NOTED TO COCCYX, BARRIER CREAM WITH MIPILEX APPLIED. PATIENT ENCOURAGED TO
REPOSITION AND MOVE.

## 2021-02-04 NOTE — NUR
PT HAD LENGTHY DISCUSSION WITH  IN REGARDS TO HER CURRENT SITUATION.
SHE AGREED, SHOULD THE TIME COME, TO NOT HAVE ELECTRICITY OR HEART MASSAGE
(CPR/DEFIBRILLATION). SPEAKING WITH SHANELL, PASTORAL CARE, SHE IS STATING
THAT SHE DOES WANT THOSE THINGS. PATIENT CONTINUES TO ANSWER QUESTIONS
APPROPRIATELY AND NODS APPROVAL.

## 2021-02-04 NOTE — NUR
PT AWAKE ANSWERING APPROPRIATELY BLOOD PRESSURE CONTINUES TO BE LABILE,
 MADE AWARE, HE ORDERED VASOPRESSIN. SPOKE BRIEFLY WITH PATIENT. SHE
NODS, AND SMILES. SHE CONTINUES TO BE WITHOUT RESTRAINTS, SHE TOUCHES AND
HOLDS THE TUBING FOR THE SUCTION AND THE CIRCUIT, BUT DOES NOT PULL ON IT.

## 2021-02-04 NOTE — NUR
Spiritual care note:
 
Asked by staff to meet with Rimma as her health is declining.  I offered
gentle  and encouragement.  She made it known that she was not
interested in  visit by giving me a look of anger and then just
staring at me without response.  I will remain available to pt and family.

## 2021-02-04 NOTE — NUR
ASSUMED CARE NOTE:
 
ASSUMED CARE OF PT AT 1900, RECEVIED REPORT FROM DELIO SALAS. PT IS ALERT AND
ORIENTED TO SELF, ABLE TO FOLLOW COMMANDS AND ANSWER QUESTIONS USING HAND
GESTURES. PT IS ON VENT, SETTINGS PS 15/PEEP12/FiO2 90%  PT IS HAVING THIN RED
TINGED SECTRETIONS. PT IS SINUS TACH BBB, HR BETWEEN . BP STABLE WITH
PRESSORS, LEVOPHED AT 15MCG/MIN, VASOPRESSIN 0.04U/MIN.  PT DENIES ANY
PAIN/NAUSEA AT THIS TIME. ARITA PATENT DRAINING TO GRAVITY, CLEAR YELLOW URINE
NOTED. WILL CONTINUE TO MONITOR PT.

## 2021-02-04 NOTE — NUR
PT WITH SATS IN THE 80S % OXYGEN, VENT AT 14,490 TIDAL VOLUME/12 PEEP/
PT QUESTIONED ABOUT HER BOTTOM, REPORTED TO HAVE A DECUBITUS, SHE IS NOT ABLE
TO TELL ME HOW IT IS FEELING. SHE IS ANSWERING QUESTIONS BUT SHE IS NOT ABLE
TO WRITE ON HER BOARD. WITH HER SATS NOT RISING, NOT GOING TO TURN HER AT THIS
TIME.

## 2021-02-04 NOTE — NUR
LEFT UPPER ARM 18G POWER GLIDE REMOVED,ATTEMPTED TO PLACE PICC LINE THROUGH
AND IT WAS UNSUCCESSFUL. PICC WAS PLACED IN THE RIGHT UPPER ARM.

## 2021-02-04 NOTE — NUR
MALATHI IS CURRENTLY RESTING IN BED, VENTILATOR ON SPONTANEOUS PEEP 12, OXYGEN
AT 70%, SATS >90%. IV WITH 1 LITER NORMAL SALINE FINISHING UP, VASOPRESSIN @
0.04U/ML AND LEVOPHED @ 15MCG/MIN. BLOOD PRESSURE IS TRENDING UPWARD, SHE IS
TOLERATING SIPS OF WATER, ARITA DRAINS CLEAR YELLOW, NO BOWEL MOVEMENT. PT
CONTINUES TO NOD AND SHAKE HER HEAD APPROPRIATELY, BUT DIDN'T REMEMBER
DRINKING WATER, OR THAT THERE WAS A STRAW IN IT, SHE TRIED TO DRINK FROM THE
LID. COCCYX WITH FOAM PAD NOW IN PLACE, PATIENT HAS BEEN POSITIONING HERSELF,
MOVING ALL EXTREMITIES AND NOT PULLING OFF HER CIRCUIT. WILL GIVE REPORT TO
NEXT SHIFT WHEN AVAILABLE.

## 2021-02-05 NOTE — NUR
PT TO CT SCAN OF HEAD WITH AND WITHOUT CONTRAST. TOLERATED WELL MAP 60-65 WITH
LEVOPHED @ 3 MCG/MIN. MAINTAINS SATS>90% OF FIO2 70%. PT CONTINUES TO DENY
PAIN OR ANXIETY.

## 2021-02-05 NOTE — NUR
PT A&0X3-OCCASIONALLY CONFUSED, BUT COOPERATIVE WITH CARE AND NO LONGER
AGITATED. MAINTAINS SATS>90% ON FIO2 60%. ASSISTED PT WITH APROX. 50 CC OF
FULL LIQUID SOUP. PT APPETITE POOR, BUT SHE DENIES NAUSEA. PT REQUESTS "PUREE"
DIET FOR DINNER-ORDER PLACED IN COMPUTER.

## 2021-02-05 NOTE — NUR
SHIFT SUMMARY: '
 
PT CONTINUES TO BE ALERT AND ORIENTEDX3. VENT SETTINGS CHANGED TO PS 15, PEEP
12, FiO2 OF 80%, SPO2 REMAINED ABOVE 90% PT HAVING SMALL AMOUNTS OF RED TINGED
SECRETIONS. PT HAS BEEN IN SINUS RHYTHM TO SINUS TACH HR BETWEEN . PT
CONTINUES TO BE ON LEVOPHED, WHICH HAS BEEN TITRATED DOWN TO 8MCG/MIN,
VASOPRESSIN AT 0.04U/MINM, TO MAINTAIN MAP GREATER THAN 65. PT HAS DENIED PAIN
T/O SHIFT. NO BM THIS SHIFT, ACTIVE BT HEARD IN ALL QUADRANTS. PT PROVIDED
WITH ORAL CARE EVERY 1HR FOR COMFORT. PT ABLE TO MOVE ALL EXTREMITIES, WEAK,
HOWEVER ABLE TO ASSIST WITH TURNS AND FOLLOWS DIRECTIONS. MEPILEX ON COCCYX
CONTINUES TO BE C/D/I. ARITA PATENT AND DRAINING TO GRAVITY, 2000ML OUTPUT
THIS SHIFT.

## 2021-02-05 NOTE — NUR
PT REPORTING RIGHT HAND NUMBNESS AND WEAKNESS. DR. SEGOVIA IN TO SEE PT. PT
INDICATED THAT SHE FELT THAT THE WEAKNESS STARTED YESTERDAY. HOWEVER, DURING
AM ASSESSMENT, PT WAS USING HER RIGHT ARM TO HOLD HERSELF ON HER SIDE WHILE
STAFF CLEANED HER UP AND CHANGED HER BED. PT INDICATING THAT SHE IS FRUSTRATED
WITH HER "BOARD." CNA AT BEDSIDE ASSISTING WITH COMMUNICATION BOARD.

## 2021-02-05 NOTE — NUR
KRISTINE (SISTER) OF PATIENT WAS NOTIFED OF CT SCAN RESULTS BY DAYSHIFT NURSE.
THIS NURSE INFORMED THE HOSPITALIST ANICETO OF CT RESULTS AS REQUESTED BY
.

## 2021-02-05 NOTE — NUR
ASSUMED CARE NOTE:
 
ASSUMED CARE OF PT AT 1900, RECEVIED REPORT FROM MEL SALAS. PT IS ALERT AND
ORIENTED TO SELF, FOLLOWING COMMANDS AND ANSWERING YES/NO QUESTIONS USING HEAD
GEASTURES. PT IS ABLE TO MOUTH A FEW WORDS, UNABLE TO SPEAK DUE TO TRACH. PT
RIGHT UPPER EXTREMITY FLACID, UNABLE TO MOVE. RIGHT LEG HAS SOME MOVEMENT,
HOWEVER PT IS NOT ABLE TO MOVE IT ON COMMAND. RIGHT SIDED NEGLECT NOTED, STS
SHE HAS NO FEELING TO RIGHT EXTREMITIES. PT IS ON VENT WITH SETTINGS AT PS 15,
PEEP 12, FiO2 70% , SPO2 ABOVE 90% , RR BETWEEN 16-20. NO RESP DISTRESS NOTED.
PT IN SINUS TACH W BBB, HR  AS PER MONITOR. PT DENIES ANY PAIN AT THIS
TIME. BP STABLE WITH LEVOPHED RUNNING AT 4MCG/MIN. ARITA PATENT, DRAINING TO
GRAVITY. BED AT LOWEST LEVEL, CALL LIGHT WITHIN REACH.

## 2021-02-05 NOTE — NUR
PT IS VERY CONFUSED AND AGITATED. SHE IS TRYING TO COMMUNICATE, BUT NOT MAKING
ANY SENSE AT THIS TIME. SATS 83%- DR. SEGOVIA MADE AWARE. PT MED WITH ATIVAN 1
MG IVPX1 FOR AGITATION SEE EMAR.

## 2021-02-05 NOTE — NUR
PHYSICAL THERAPY IN TO EVALUATE PT. PT NOW APPEARS TO HAVE WEAKNESS TO RIGHT
LOWER EXTREMITY AND RIGHT SIDED NEGLECT. RIGHT ARM REMAINS WEAK WITH ONLY
GROSS MOTOR MOVEMENT-NO FINE MOTOR SKILLS. PT DANGLED WITH PHYSICAL THERAPY
AND LISTS TO THE RIGHT SIDE. SHE NEEDED PROMPTING TO HOLD ON WITH HER LEFT
HAND AND HOLD HERSELF UP. MAP TRENDING LESS THAN 60 WITH LEVOPHED @ 5
MCG-VASOPRESSIN DRIP RESUMED.

## 2021-02-05 NOTE — NUR
PT CONTINUES WITH #6.0 SHILEY TRACH TO VENT. PT IS A&OX3. PT COMMUNICATING
NEEDS BY MOUTHING WORDS AND GESTURING . PT USES CALL LIGHT APPROPRIATELY. PT
DENIES PAIN OR ANXIETY AT THIS TIME. SHE HAS RECEIVED NO SEDATION OF ANY KIND
SINCE YESTERDAY AFTERNOON. ECG SHOWS SR TO ST WITH BBB. MAP 60-65 WITH
VASOPRESSIN @0.04 UNITS/MIN AND LEVOPHED @ 5 MCG/MIN. NO NOTED EDEMAN-DP/PT
PULSES STRONG. LUNGS COARSE TO UPPER LOBES AND DIMINISHED IN THE BASES. SMALL
AMOUNT OF SANGINOUS DRAINAGE NOTED TO TRACH DRAIN SPONGE. TRACH SUCTION
PRODUCTIVE OF A SMALL, AMOUNT OF THIN, BLOOD TINGED SPUTUM. PT ABLE TO SWALLOW
MEDS WITH A SIP OF H2O WITHOUT SIGNS OF ASPIRATION. PT REPORTS THAT SHE IS NOT
HUNGRY. SHE REFUSES HER BREAKFAST TRAY. PT INCONTINENT OF MODERATE AMOUNT OF
SOFT, BROWN STOOL. DRESSING CHANGE COMPLETED TO COCCYX-MEPILEX DRESSING C/D/I.
WOUNDS APPEAR TO BE LESS RED THAN 2/4/21. PT REPOSITIONED TO COMFORT-SUPINE
WITH HOB ELEVATED AND EXTREMITIES ON PILLOWS.

## 2021-02-05 NOTE — NUR
RN SPOKE WITH DR. SEGOVIA REGARDING CT RESULTS-IT APPEARS THAT PT HAS INFACT
HAD A ISCHEMIC STROKE. RIGHT SIDED WEAKNESS AND NEGLECT CONTINUES. PT
A&OX3-NODS APPROPRIATELY TO "YES" OR "NO" QUESTIONS. PT ABLE TO TAKE XARELTO
IN PUREED FRUIT WITH A SIP OF WATER AND NO SIGNS OF ASPIRATION. PT APPETITE
POOR-SHE HAS REFUSED HER DINNER TRAY. DR. SEGOVIA HAS REQUESTED THAT PT ALEJANDRO VICKERS BE UPDATED TO THE RESULTS OF THE CT SCAN.

## 2021-02-05 NOTE — NUR
TRACH CARE COMPLETED. PT TOLERATED WELL. SMALL AMOUNT OF SANGINOUS DRAINAGE
AROUND TRACH. SPUTUM SPECIMEN SENT PER DR. SEGOVIA ORDERS. PT RESTING QUIETLY
ON VENT WHEN NOT DISTURBED. AWAKENED TO VERBAL AND FOLLOWS COMMANDS, BUT NO
LONGER AGITATED AND MAINTAINS SATS>90%

## 2021-02-06 NOTE — NUR
REASSESSMENT
PT IS ALERT, ORIENTED TO PERSON, FOLLOWS COMMANDS AND NODS YES OR NO
APPROPRIATELY. NO CHANGE TO RIGHT SIDED DEFICITS. TUBE FEEDS VIA DOBHOFF
INITIATED THIS AFTERNOON. PT'S SISTER WAS ABLE COME VISIT PT TODAY. LEVOPHED
TITRATED BACK UP TO 6MCG/MIN TO MAINTAIN MAP >65. PS/12 ON VENT FIO2 @ 70%.

## 2021-02-06 NOTE — NUR
1845: PT SPO2 AT 82%, PT REPOSITIONED TO HIGH  FOWLERS POSITION, FiO2
INCREASED %.  SUCTIONED VIA TRACH, MINIMAL SECRETIONS NOTED. AFTER 15
MINUTES PT SPO2 93%, FiO2 AT 90% RT CALLED TO NOTIFY.

## 2021-02-06 NOTE — NUR
SHIFT SUMMARY:
 
NO SIGNIFICANT CHANGES T/O SHIFT. PT CONTINUES TO BE ALERT AND ORIENTED TO
SELF AND ANSWERING QUESTIONS USING HEAD GEASTURES. SHE IS ABLE TO MOUTH A FEW
WORDS TO MAKE HER NEEDS KNOWN. PT ON VENT WITH SETTINGS AT PS15/PEEP12/FiO2
65% NO RESP DISTRESS NOTED. PT HAS BEEN IN SINUS TACH WITH BBB, HR BETWEEN
. LEVOPHED AT 6MCG/MIN, VASOPRESSIN ON SB. NO BM THIS SHIFT. ARITA
PATENT, DRAINING TO GRAVITY.  CAME TO EVALUATE PT, PT AGREED TO TUBE
FEEDS VIA DOBHOFF. REPORT GIVEN TO VINCENT RIDLEY RN.

## 2021-02-06 NOTE — NUR
ASSUMED CARE NOTE:
 
ASSUMED CARE OF PT AT 1900, RECEVIED REPORT FROM VINCENT RIDLEY RN. PT IS ALERT AND
ORIENTED, PT IS ABLE TO ANSWER YES/NO QUESTIONS APPROPRIATLY. PT IS ON VENT
WITH SETTINGS AT PS15/PEEP5/[id number]%, SPO2 ABOVE 90% PT IS IN SIT W/BBB, HR IN
-115. PT DENIES ANY SOB/PAIN AT THIS TIME. BP STABLE WITH LEVOPHED AT
5MCG/MIN.  DOBHOFF TUBE FEED RUNNING AT 25ML/HR. ARITA PATNET DRAINING TO
GRAVITY. BED AT LOWEST LEVEL. WILL CONTINUE TO MONITOR PT T/O SHIFT.

## 2021-02-06 NOTE — NUR
SHIFT SUMMARY
PT IS ALERT AND ORIENTED TO PERSON, FOLLOWS COMMANDS, RESPONDS CORRECTLY BY
NODING HEAD YES AND NO. RIGHT ARM FLACCID, RIGHT LEG RESPONDS TO PAINFUL
STIMULI. PHYSICAL THERAPY WAS ABLE TO WORK WITH PT TODAY AND ABLE TO DANGLE
FOR SHORT PERIOD OF TIME. DOBHOFF PLACED TODAY AND TUBE FEEDS STARTED THIS
AFTERNOON. PT APPEARS TO BE TOLERATING. LEVOPHED REMAINS ON AT 6MCG/MIN, WAS
ABLE TO TITRATE DOWN FOR A FEW HOURS TODAY TO 4MCG/MIN. PT REMAINS ON PS/12 ON
VENT WITH TRACH, FIO2 AT 70%.

## 2021-02-06 NOTE — NUR
ASSUMED CARE OF PT
PT IS ALERT AND ORIENTED TO PERSON, LYING IN BED. APPROPRIATELY NODS HEAD YES
OR NO. RIGHT SIDE EXTREMETIES FLACCID. PLAN FOR DOBHOFF PLACEMENT, WHICH PT
AGREES TO. 6.0 SHILEY IN PLACE, CONTECTED TO VENT WITH PS/12 FIO2 65%. BHANU
US COMPLETED THIS MORNING, PER US TECH REQUEST, BP OBTAINED ON BOTH SIDES.
LEFT ARM BP CHECKED MULTIPLE TIMES AND CUFF SIZES CHANGED TO VERIFY, HOWEVER
SYSTOLIC PRESSURE READ IN 50'S. RIGHT ARM BP CONTINUES TO MAINTAIN IN
PREVIOUS RANGE. DR MICHEL AND LUCA BOTH NOTIFIED OF FINDINGS. ARITA IN PLACE
TO GRAVITY DRAINING CLEAR YELLOW URINE.

## 2021-02-07 NOTE — NUR
0300: PT SPO2 85% , WALKED INTO ROOM, LARGE AMOUNTS OF RED MARYCHUY SECRETIONS
FROM TRACH.
0329: CALLED  REGARDING PT STATUS, ORDERS TO PLACE TO ON LEFT SIDE AS
MUCH AS POSSIBLE. MANUALLY VENTALATING THE PT VIA AMBU BAG. SPO2 DECRESING
INTO THE 70'S. PT VERY ANXIOUS. FAMILY NOTIFED, THEY ARE UNABLE TO COME AT
THIS TIME.
0340: ALEJANDRO CARMONA TALKING TO  REGARDING COMFORT CARE. SPO2 54% RT
CONTINUES TO MANUALLY VENT PT.